# Patient Record
Sex: MALE | NOT HISPANIC OR LATINO | URBAN - METROPOLITAN AREA
[De-identification: names, ages, dates, MRNs, and addresses within clinical notes are randomized per-mention and may not be internally consistent; named-entity substitution may affect disease eponyms.]

---

## 2019-01-16 ENCOUNTER — OFFICE VISIT - HEALTHEAST (OUTPATIENT)
Dept: FAMILY MEDICINE | Facility: CLINIC | Age: 24
End: 2019-01-16

## 2019-01-16 DIAGNOSIS — B34.9 VIRAL SYNDROME: ICD-10-CM

## 2019-01-16 ASSESSMENT — MIFFLIN-ST. JEOR: SCORE: 1772.1

## 2021-06-02 VITALS — WEIGHT: 159.25 LBS | BODY MASS INDEX: 20.44 KG/M2 | HEIGHT: 74 IN

## 2021-06-18 NOTE — LETTER
Letter by Magen Wahl MD at      Author: Magen Wahl MD Service: -- Author Type: --    Filed:  Encounter Date: 1/16/2019 Status: (Other)       January 16, 2019     Patient: Ruben Del Real   YOB: 1995   Date of Visit: 1/16/2019       To Whom It May Concern:    It is my medical opinion that Ruben Del Real may return to work on 1/17/19. Please excuse patient for missing work 1/15/19, and 1/16/19 due to his medical condition.     If you have any questions or concerns, please don't hesitate to call.    Sincerely,        Electronically signed by Magen Wahl MD

## 2021-06-23 NOTE — PROGRESS NOTES
Chief Complaint   Patient presents with     Fatigue     no appetite, sleeping more     Cough     stuffy nose, sneezing, x3 days          HPI      Patient is here for four days of cough, with nasal congestion, fatigue. He vomited once on the first day but none since. He has had intermittent watery nonbloody diarrhea since. Last diarrhea was 2 days ago. No fever, chest pain, shortness of breath, abdominal pain, generalized body aches.     ROS: Pertinent ROS noted in HPI.     No Known Allergies    There is no problem list on file for this patient.      No family history on file.    Social History     Socioeconomic History     Marital status: Single     Spouse name: Not on file     Number of children: Not on file     Years of education: Not on file     Highest education level: Not on file   Social Needs     Financial resource strain: Not on file     Food insecurity - worry: Not on file     Food insecurity - inability: Not on file     Transportation needs - medical: Not on file     Transportation needs - non-medical: Not on file   Occupational History     Not on file   Tobacco Use     Smoking status: Current Every Day Smoker     Types: Cigarettes     Smokeless tobacco: Former User     Types: Chew   Substance and Sexual Activity     Alcohol use: Not on file     Drug use: Not on file     Sexual activity: Not on file   Other Topics Concern     Not on file   Social History Narrative     Not on file         Objective:    Vitals:    01/16/19 1650   BP: 101/64   Pulse: 82   Resp: 20   Temp: 98.5  F (36.9  C)   SpO2: 96%       Gen:NAD  Throat: oropharynx clear, tonsils normal  Ears: TMs clear without effusion, ear canals normal with small cerumen  Nose: no discharge   Neck: No significant adenopathy  CV: RRR, normal S1S2, no M, R,G   Pulm: CTAB, normal effort  Abd: Normal inspection, normal bowel sounds, soft, no pain, no mass/HSM    Assessment:    Viral syndrome - normal exam.    Plan:    Patient was reassured.  Fluids, rest as  directed.   OTC Robitussin prn for cough.

## 2021-06-23 NOTE — PATIENT INSTRUCTIONS - HE
Advised fluids, rest, Robitussin as needed for cough.      Follow up with your primary care provider if no improvement in three days.